# Patient Record
Sex: FEMALE | Race: WHITE | NOT HISPANIC OR LATINO | Employment: FULL TIME | URBAN - METROPOLITAN AREA
[De-identification: names, ages, dates, MRNs, and addresses within clinical notes are randomized per-mention and may not be internally consistent; named-entity substitution may affect disease eponyms.]

---

## 2018-07-26 ENCOUNTER — TELEPHONE (OUTPATIENT)
Dept: CARDIOLOGY | Facility: CLINIC | Age: 53
End: 2018-07-26

## 2018-07-26 ENCOUNTER — HOSPITAL ENCOUNTER (EMERGENCY)
Facility: HOSPITAL | Age: 53
Discharge: HOME | End: 2018-07-26
Attending: EMERGENCY MEDICINE
Payer: COMMERCIAL

## 2018-07-26 VITALS
RESPIRATION RATE: 16 BRPM | HEART RATE: 80 BPM | SYSTOLIC BLOOD PRESSURE: 132 MMHG | WEIGHT: 207.88 LBS | TEMPERATURE: 98 F | BODY MASS INDEX: 34.63 KG/M2 | DIASTOLIC BLOOD PRESSURE: 86 MMHG | HEIGHT: 65 IN | OXYGEN SATURATION: 97 %

## 2018-07-26 DIAGNOSIS — R07.9 CHEST PAIN: Primary | ICD-10-CM

## 2018-07-26 PROBLEM — R07.89 OTHER CHEST PAIN: Status: ACTIVE | Noted: 2018-07-26

## 2018-07-26 LAB
ALBUMIN SERPL BCP-MCNC: 4 G/DL
ALP SERPL-CCNC: 65 U/L
ALT SERPL W/O P-5'-P-CCNC: 15 U/L
ANION GAP SERPL CALC-SCNC: 9 MMOL/L
AST SERPL-CCNC: 18 U/L
BASOPHILS # BLD AUTO: 0.04 K/UL
BASOPHILS NFR BLD: 0.5 %
BILIRUB SERPL-MCNC: 0.7 MG/DL
BUN SERPL-MCNC: 11 MG/DL
CALCIUM SERPL-MCNC: 9.7 MG/DL
CHLORIDE SERPL-SCNC: 103 MMOL/L
CO2 SERPL-SCNC: 27 MMOL/L
CREAT SERPL-MCNC: 0.9 MG/DL
DIFFERENTIAL METHOD: NORMAL
EOSINOPHIL # BLD AUTO: 0.2 K/UL
EOSINOPHIL NFR BLD: 1.7 %
ERYTHROCYTE [DISTWIDTH] IN BLOOD BY AUTOMATED COUNT: 13.1 %
EST. GFR  (AFRICAN AMERICAN): >60 ML/MIN/1.73 M^2
EST. GFR  (NON AFRICAN AMERICAN): >60 ML/MIN/1.73 M^2
GLUCOSE SERPL-MCNC: 97 MG/DL
HCT VFR BLD AUTO: 42.5 %
HGB BLD-MCNC: 14.1 G/DL
IMM GRANULOCYTES # BLD AUTO: 0.02 K/UL
IMM GRANULOCYTES NFR BLD AUTO: 0.2 %
LYMPHOCYTES # BLD AUTO: 2.7 K/UL
LYMPHOCYTES NFR BLD: 30.8 %
MCH RBC QN AUTO: 29.6 PG
MCHC RBC AUTO-ENTMCNC: 33.2 G/DL
MCV RBC AUTO: 89 FL
MONOCYTES # BLD AUTO: 0.5 K/UL
MONOCYTES NFR BLD: 5.8 %
NEUTROPHILS # BLD AUTO: 5.4 K/UL
NEUTROPHILS NFR BLD: 61 %
NRBC BLD-RTO: 0 /100 WBC
PLATELET # BLD AUTO: 266 K/UL
PMV BLD AUTO: 11.1 FL
POTASSIUM SERPL-SCNC: 3.4 MMOL/L
PROT SERPL-MCNC: 7.6 G/DL
RBC # BLD AUTO: 4.76 M/UL
SODIUM SERPL-SCNC: 139 MMOL/L
TROPONIN I SERPL DL<=0.01 NG/ML-MCNC: <0.006 NG/ML
WBC # BLD AUTO: 8.79 K/UL

## 2018-07-26 PROCEDURE — 93010 ELECTROCARDIOGRAM REPORT: CPT | Mod: ,,, | Performed by: INTERNAL MEDICINE

## 2018-07-26 PROCEDURE — 84484 ASSAY OF TROPONIN QUANT: CPT

## 2018-07-26 PROCEDURE — 25000003 PHARM REV CODE 250: Performed by: EMERGENCY MEDICINE

## 2018-07-26 PROCEDURE — 93005 ELECTROCARDIOGRAM TRACING: CPT | Performed by: EMERGENCY MEDICINE

## 2018-07-26 PROCEDURE — 99285 EMERGENCY DEPT VISIT HI MDM: CPT | Mod: ,,, | Performed by: EMERGENCY MEDICINE

## 2018-07-26 PROCEDURE — 99284 EMERGENCY DEPT VISIT MOD MDM: CPT | Mod: 25 | Performed by: EMERGENCY MEDICINE

## 2018-07-26 PROCEDURE — 80053 COMPREHEN METABOLIC PANEL: CPT

## 2018-07-26 PROCEDURE — 85025 COMPLETE CBC W/AUTO DIFF WBC: CPT

## 2018-07-26 PROCEDURE — 99282 EMERGENCY DEPT VISIT SF MDM: CPT | Mod: ,,, | Performed by: INTERNAL MEDICINE

## 2018-07-26 RX ORDER — ASPIRIN 325 MG
325 TABLET ORAL
Status: COMPLETED | OUTPATIENT
Start: 2018-07-26 | End: 2018-07-26

## 2018-07-26 RX ADMIN — ASPIRIN 325 MG ORAL TABLET 325 MG: 325 PILL ORAL at 03:07

## 2018-07-26 NOTE — TELEPHONE ENCOUNTER
"Spoke with the pt at the time of her call - says that her   last week in his sleep after cardiac problems.  Says that last night she had "wrenching pain in her chest - took one SL NTG and pain was relieved. Says that now she is having a heaviness in her chest. Spoke with Dr. Benavidez - says for the pt to go to the ED for evaluation.  The pt verbalized understanding.  "

## 2018-07-26 NOTE — SUBJECTIVE & OBJECTIVE
Past Medical History:   Diagnosis Date    Asthma     Coronary artery disease     Hyperlipidemia     Hypertension        Past Surgical History:   Procedure Laterality Date    TUBAL LIGATION  1993       Review of patient's allergies indicates:  No Known Allergies    No current facility-administered medications on file prior to encounter.      Current Outpatient Prescriptions on File Prior to Encounter   Medication Sig    lisinopril (PRINIVIL,ZESTRIL) 20 MG tablet TK 1 T PO QAM     Family History     Problem Relation (Age of Onset)    Cancer Maternal Grandfather    Diabetes type II Mother    Heart disease Father    Hypertension Mother        Social History Main Topics    Smoking status: Current Every Day Smoker     Types: Cigarettes    Smokeless tobacco: Current User      Comment: 1 pack per week    Alcohol use Yes      Comment: rare, yesterday    Drug use: No    Sexual activity: Yes     Partners: Male     Birth control/ protection: Surgical      Comment:      Review of Systems   Constitution: Negative for chills and fever.   HENT: Negative for hoarse voice and sore throat.    Cardiovascular: Positive for chest pain. Negative for claudication, cyanosis, dyspnea on exertion, irregular heartbeat, leg swelling, near-syncope, orthopnea, palpitations, paroxysmal nocturnal dyspnea and syncope.   Respiratory: Negative for cough, hemoptysis and shortness of breath.    Musculoskeletal: Negative for back pain, joint pain and joint swelling.   Gastrointestinal: Negative for abdominal pain, constipation, diarrhea, hematochezia, melena, nausea and vomiting.   Genitourinary: Negative for dysuria, hematuria and incomplete emptying.   Neurological: Negative for dizziness, headaches and light-headedness.   Psychiatric/Behavioral: Negative for altered mental status, depression and suicidal ideas. The patient is not nervous/anxious.      Objective:     Vital Signs (Most Recent):  Temp: 97.8 °F (36.6 °C) (07/26/18  1254)  Pulse: 80 (07/26/18 1502)  Resp: 16 (07/26/18 1502)  BP: 132/86 (07/26/18 1502)  SpO2: 97 % (07/26/18 1502) Vital Signs (24h Range):  Temp:  [97.8 °F (36.6 °C)] 97.8 °F (36.6 °C)  Pulse:  [80-98] 80  Resp:  [16-18] 16  SpO2:  [96 %-97 %] 97 %  BP: (132-146)/(86-93) 132/86     Weight: 94.3 kg (207 lb 14.3 oz)  Body mass index is 34.6 kg/m².    SpO2: 97 %  O2 Device (Oxygen Therapy): room air    No intake or output data in the 24 hours ending 07/26/18 1559    Lines/Drains/Airways     Peripheral Intravenous Line                 Peripheral IV - Single Lumen 07/26/18 1356 Left Antecubital less than 1 day                Physical Exam   Constitutional: She is oriented to person, place, and time. She appears well-developed and well-nourished. No distress.   HENT:   Head: Normocephalic and atraumatic.   Right Ear: External ear normal.   Left Ear: External ear normal.   Nose: Nose normal.   Mouth/Throat: Oropharynx is clear and moist. No oropharyngeal exudate.   Eyes: Conjunctivae and EOM are normal. Pupils are equal, round, and reactive to light. Right eye exhibits no discharge. Left eye exhibits no discharge. No scleral icterus.   Neck: Normal range of motion. Neck supple. No JVD present. No tracheal deviation present. No thyromegaly present.   Cardiovascular: Normal rate, regular rhythm, normal heart sounds and intact distal pulses.  Exam reveals no gallop and no friction rub.    No murmur heard.  Pulmonary/Chest: Effort normal and breath sounds normal. No stridor. No respiratory distress. She has no wheezes. She has no rales. She exhibits no tenderness.   Abdominal: Soft. Bowel sounds are normal. She exhibits no distension and no mass. There is no tenderness. There is no rebound and no guarding.   Musculoskeletal: Normal range of motion. She exhibits no edema, tenderness or deformity.   Lymphadenopathy:     She has no cervical adenopathy.   Neurological: She is alert and oriented to person, place, and time. No  cranial nerve deficit.   Skin: Skin is warm and dry. No rash noted. She is not diaphoretic. No erythema. No pallor.   Psychiatric: She has a normal mood and affect. Her behavior is normal. Thought content normal.   Nursing note and vitals reviewed.      Significant Labs:   CMP   Recent Labs  Lab 07/26/18  1356      K 3.4*      CO2 27   GLU 97   BUN 11   CREATININE 0.9   CALCIUM 9.7   PROT 7.6   ALBUMIN 4.0   BILITOT 0.7   ALKPHOS 65   AST 18   ALT 15   ANIONGAP 9   ESTGFRAFRICA >60.0   EGFRNONAA >60.0   , CBC   Recent Labs  Lab 07/26/18  1356   WBC 8.79   HGB 14.1   HCT 42.5       and Troponin   Recent Labs  Lab 07/26/18  1356   TROPONINI <0.006       Significant Imaging: EKG: NSR, LBBB

## 2018-07-26 NOTE — ED NOTES
"Patient identifiers verified and correct for Ms Carroll  C/C: freddie rib CP, states she feels "sad" and "numb"   APPEARANCE: awake and alert in NAD.  SKIN: warm, dry and intact. No breakdown or bruising.  MUSCULOSKELETAL: Patient moving all extremities spontaneously, no obvious swelling or deformities noted. Ambulates independently.  RESPIRATORY: Denies shortness of breath.Respirations unlabored.   CARDIAC Positive freddie rib pain, 2+ distal pulses; no peripheral edema, relieved with nitro.   ABDOMEN: S/ND/NT, Denies nausea  : voids spontaneously, denies difficulty  Neurologic: AAO x 4; follows commands equal strength in all extremities; denies numbness/tingling. Denies dizziness Denies weakness    "

## 2018-07-26 NOTE — CONSULTS
[facility]  Cardiology  Consult Note    Patient Name: Ashley Carroll  MRN: [id number]  Admission Date: 7/26/2018  Hospital Length of Stay: 0 days  Code Status: No Order   Attending Provider: Piedad Leong MD   Consulting Provider: Bianca Godinez MD  Primary Care Physician: Kenny Buck MD  Principal Problem:<principal problem not specified>    Patient information was obtained from patient and past medical records.     Inpatient consult to Cardiology  Consult performed by: BIANCA GODINEZ  Consult ordered by: PIEDAD LEONG  Reason for consult: Chest pain        Subjective:     Chief Complaint:  Chest pain     HPI:   53 y.o F with a PMHx of HTN and HLD that presents today with an episode of chest pain which she describes as burning, substernal that occurred yesterday following eating enchiladas at around midnight. She reports the episode lasted 5-10 minutes and she took a NTG which she had an old supply of which helped with her discomfort.  recently passed away on 7/17 and she reports she has been heart broken since that time. She reports having a baseline chest heaviness since that time but her pain yesterday was worse in both intensity and nature. She reports social / intermittent smoking. Denies any further episodes since that time. Currently chest pain free.    She was previously seen by Dr. Benavidez in 2014. Reported [place] and sensation of a punch in the chest when she exerted herself / exercised. Underwent coronary CTA at that time which showed normal coronaries with CAC of 0. Her ECG at that time showed NSR with LBBB.     ECG today: NSR, LBBB, does not meet modified Sgarbossas criteria.     Past Medical History:   Diagnosis Date    Asthma     Coronary artery disease     Hyperlipidemia     Hypertension        Past Surgical History:   Procedure Laterality Date    TUBAL LIGATION  1993       Review of patient's allergies indicates:  No Known Allergies    No current  facility-administered medications on file prior to encounter.      Current Outpatient Prescriptions on File Prior to Encounter   Medication Sig    lisinopril (PRINIVIL,ZESTRIL) 20 MG tablet TK 1 T PO QAM     Family History     Problem Relation (Age of Onset)    Cancer Maternal Grandfather    Diabetes type II Mother    Heart disease Father    Hypertension Mother        Social History Main Topics    Smoking status: Current Every Day Smoker     Types: Cigarettes    Smokeless tobacco: Current User      Comment: 1 pack per week    Alcohol use Yes      Comment: rare, yesterday    Drug use: No    Sexual activity: Yes     Partners: Male     Birth control/ protection: Surgical      Comment:      Review of Systems   Constitution: Negative for chills and fever.   HENT: Negative for hoarse voice and sore throat.    Cardiovascular: Positive for chest pain. Negative for claudication, cyanosis, dyspnea on exertion, irregular heartbeat, leg swelling, near-syncope, orthopnea, palpitations, paroxysmal nocturnal dyspnea and syncope.   Respiratory: Negative for cough, hemoptysis and shortness of breath.    Musculoskeletal: Negative for back pain, joint pain and joint swelling.   Gastrointestinal: Negative for abdominal pain, constipation, diarrhea, hematochezia, melena, nausea and vomiting.   Genitourinary: Negative for dysuria, hematuria and incomplete emptying.   Neurological: Negative for dizziness, headaches and light-headedness.   Psychiatric/Behavioral: Negative for altered mental status, depression and suicidal ideas. The patient is not nervous/anxious.      Objective:     Vital Signs (Most Recent):  Temp: 97.8 °F (36.6 °C) (07/26/18 1254)  Pulse: 80 (07/26/18 1502)  Resp: 16 (07/26/18 1502)  BP: 132/86 (07/26/18 1502)  SpO2: 97 % (07/26/18 1502) Vital Signs (24h Range):  Temp:  [97.8 °F (36.6 °C)] 97.8 °F (36.6 °C)  Pulse:  [80-98] 80  Resp:  [16-18] 16  SpO2:  [96 %-97 %] 97 %  BP: (132-146)/(86-93) 132/86      Weight: 94.3 kg (207 lb 14.3 oz)  Body mass index is 34.6 kg/m².    SpO2: 97 %  O2 Device (Oxygen Therapy): room air    No intake or output data in the 24 hours ending 07/26/18 1559    Lines/Drains/Airways     Peripheral Intravenous Line                 Peripheral IV - Single Lumen 07/26/18 1356 Left Antecubital less than 1 day                Physical Exam   Constitutional: She is oriented to person, place, and time. She appears well-developed and well-nourished. No distress.   HENT:   Head: Normocephalic and atraumatic.   Right Ear: External ear normal.   Left Ear: External ear normal.   Nose: Nose normal.   Mouth/Throat: Oropharynx is clear and moist. No oropharyngeal exudate.   Eyes: Conjunctivae and EOM are normal. Pupils are equal, round, and reactive to light. Right eye exhibits no discharge. Left eye exhibits no discharge. No scleral icterus.   Neck: Normal range of motion. Neck supple. No JVD present. No tracheal deviation present. No thyromegaly present.   Cardiovascular: Normal rate, regular rhythm, normal heart sounds and intact distal pulses.  Exam reveals no gallop and no friction rub.    No murmur heard.  Pulmonary/Chest: Effort normal and breath sounds normal. No stridor. No respiratory distress. She has no wheezes. She has no rales. She exhibits no tenderness.   Abdominal: Soft. Bowel sounds are normal. She exhibits no distension and no mass. There is no tenderness. There is no rebound and no guarding.   Musculoskeletal: Normal range of motion. She exhibits no edema, tenderness or deformity.   Lymphadenopathy:     She has no cervical adenopathy.   Neurological: She is alert and oriented to person, place, and time. No cranial nerve deficit.   Skin: Skin is warm and dry. No rash noted. She is not diaphoretic. No erythema. No pallor.   Psychiatric: She has a normal mood and affect. Her behavior is normal. Thought content normal.   Nursing note and vitals reviewed.      Significant Labs:   CMP    Recent Labs  Lab 07/26/18  1356      K 3.4*      CO2 27   GLU 97   BUN 11   CREATININE 0.9   CALCIUM 9.7   PROT 7.6   ALBUMIN 4.0   BILITOT 0.7   ALKPHOS 65   AST 18   ALT 15   ANIONGAP 9   ESTGFRAFRICA >60.0   EGFRNONAA >60.0   , CBC   Recent Labs  Lab 07/26/18  1356   WBC 8.79   HGB 14.1   HCT 42.5       and Troponin   Recent Labs  Lab 07/26/18  1356   TROPONINI <0.006       Significant Imaging: EKG: NSR, LBBB    Assessment and Plan:     Other chest pain    Chest pain after eating with burning sensation that is subcostal in location that occurred yesterday evening. Lasted 5-10 minutes with improvement with NTG. CAC of 0 on coronary CTA in 2014. ECG without ischemic changes and initial troponin negative.    Plan  - Suggested H2 blocker for GERD / indigestion  - Can follow-up with PCP or cardiologist if any further episodes     Patient seen and discussed with Dr. Benavidez            VTE Risk Mitigation     None          Thank you for your consult. I will sign off. Please contact us if you have any additional questions.    Jerrod Boles MD  Cardiology   [facility]

## 2018-07-26 NOTE — ED NOTES
Pt. Denies questions or concerns regarding discharge instructions or fu. No acute distress noted. Ambulatory to lobby without distress.

## 2018-07-26 NOTE — ED TRIAGE NOTES
"Patient states on July 17th her  passed away, states she has been very "sad" states  last night with Cp like "ripping my heart out" Took nitro last night, states it did help the pain, No pain upon admit to Ed, states it feels like I am "heartbroken" Denies nausea, fever. freddie rib pain. Positive cough. Denies suicidal ideations,.   "

## 2018-07-26 NOTE — ASSESSMENT & PLAN NOTE
Chest pain after eating with burning sensation that is subcostal in location that occurred yesterday evening. Lasted 5-10 minutes with improvement with NTG. CAC of 0 on coronary CTA in 2014. ECG without ischemic changes and initial troponin negative.    Plan  - Suggested H2 blocker for GERD / indigestion  - Can follow-up with PCP or cardiologist if any further episodes     Patient seen and discussed with Dr. Benavidez

## 2018-07-26 NOTE — HPI
53 y.o F with a PMHx of HTN and HLD that presents today with an episode of chest pain which she describes as burning, substernal that occurred yesterday following eating enchiladas at around midnight. She reports the episode lasted 5-10 minutes and she took a NTG which she had an old supply of which helped with her discomfort.  recently passed away on 7/17 and she reports she has been heart broken since that time. She reports having a baseline chest heaviness since that time but her pain yesterday was worse in both intensity and nature. She reports social / intermittent smoking. Denies any further episodes since that time. Currently chest pain free.    She was previously seen by Dr. Benavidez in 2014. Reported [place] and sensation of a punch in the chest when she exerted herself / exercised. Underwent coronary CTA at that time which showed normal coronaries with CAC of 0. Her ECG at that time showed NSR with LBBB.     ECG today: NSR, LBBB, does not meet modified Sgarbossas criteria.

## 2018-07-26 NOTE — ED PROVIDER NOTES
"Encounter Date: 2018    SCRIBE #1 NOTE: I, Nhung Rodriguez, am scribing for, and in the presence of,  Dr. Leong. I have scribed the entire note.       History     Chief Complaint   Patient presents with    Chest Pain     my   , had chest pain last night, crying,had cardiac work up in past,     Time patient was seen by the provider: 1:31 PM      The patient is a 53 y.o. female who presents to the ED with a complaint of CP.  Pt notes hx of cardiac workup before.  Her  recently passed away .  CP started around 10:30 pm last night.  States CP episode lasted about 5-10 min.  Notes that at first the episode made her feel uncomfortable and then became "wrenching."  Reports that pain steadily built.  Denies exacerbation of pain with movement.  Pt took nitro with complete alleviation to CP.  States her CP felt like "overwhelming sadness."  Pt is a current 1 pack per week smoker.  She called her cardiologist, Dr. Benavidez, this morning, who told her to come to the ED.  Pt also notes stress in trying to get custody of her stepson since her  has passed.        The history is provided by the patient and medical records.     Review of patient's allergies indicates:  No Known Allergies  Past Medical History:   Diagnosis Date    Asthma     Coronary artery disease     Hyperlipidemia     Hypertension      Past Surgical History:   Procedure Laterality Date    TUBAL LIGATION       Family History   Problem Relation Age of Onset    Hypertension Mother     Diabetes type II Mother     Heart disease Father     Cancer Maternal Grandfather         Unsure what Cancer    Breast cancer Neg Hx     Colon cancer Neg Hx     Ovarian cancer Neg Hx      Social History   Substance Use Topics    Smoking status: Current Every Day Smoker     Types: Cigarettes    Smokeless tobacco: Current User      Comment: 1 pack per week    Alcohol use Yes      Comment: rare, yesterday     Review of Systems "   Constitutional: Negative for chills and fever.   HENT: Negative for ear pain and nosebleeds.    Eyes: Negative for pain and discharge.   Respiratory: Negative for shortness of breath and wheezing.    Cardiovascular: Positive for chest pain.   Gastrointestinal: Negative for abdominal pain and blood in stool.   Genitourinary: Negative for dysuria and hematuria.   Musculoskeletal: Negative for neck pain and neck stiffness.   Skin: Negative for rash.   Neurological: Negative for speech difficulty and headaches.       Physical Exam     Initial Vitals [07/26/18 1254]   BP Pulse Resp Temp SpO2   (!) 146/93 98 18 97.8 °F (36.6 °C) 96 %      MAP       --         Physical Exam    Nursing note and vitals reviewed.    Appearance: No acute distress.  Skin: No rashes seen.  Good turgor.  No abrasions.  No ecchymoses.  Eyes: No conjunctival injection.  ENT: Oropharynx clear.    Chest: Clear to auscultation bilaterally.  Good air movement.  No wheezes.  No rhonchi.  Cardiovascular: Regular rate and rhythm.  No murmurs. No gallops. No rubs.  Abdomen: Soft.  Not distended.  Nontender.  No guarding.  No rebound.  Musculoskeletal: Good range of motion all joints.  No deformities.  Neck supple.  No meningismus.  Neurologic: Motor intact.  Sensation intact.  Cerebellar intact.  Cranial nerves intact.  Mental Status:  Alert and oriented x 3.  Appropriate, conversant.      ED Course   Procedures  Labs Reviewed   COMPREHENSIVE METABOLIC PANEL - Abnormal; Notable for the following:        Result Value    Potassium 3.4 (*)     All other components within normal limits   CBC W/ AUTO DIFFERENTIAL   TROPONIN I     EKG Readings: (Independently Interpreted)   LBBB       Imaging Results          X-Ray Chest AP Portable (Final result)  Result time 07/26/18 14:39:05    Final result by Ramy Larose MD (07/26/18 14:39:05)                 Impression:      1. No acute cardiopulmonary process.      Electronically signed by: Ramy Larose  MD  Date:    07/26/2018  Time:    14:39             Narrative:    EXAMINATION:  XR CHEST AP PORTABLE    CLINICAL HISTORY:  Chest Pain;    TECHNIQUE:  Single frontal view of the chest was performed.    COMPARISON:  CT 05/07/2014    FINDINGS:  The cardiomediastinal silhouette is prominent, magnified by technique, similar to the previous exam..  There is no pleural effusion.  The trachea is midline.  The lungs are symmetrically expanded bilaterally with mildly coarse interstitial attenuation, likely accentuated by habitus and shallow inspiratory effort..  No large focal consolidation seen.  There is no pneumothorax.  The osseous structures are remarkable for degenerative changes..                                 Medical Decision Making:   History:   Old Medical Records: I decided to obtain old medical records.  Initial Assessment:   Pt with 5-10 min CP last night.  Could be angina, less likely MI.  Also could be entirely due to stress and grief.  Will get cardiac workup in the ED.  EKG shows LBBB, which is old and looks unchanged from 2016. Will have cardiology see the patient given that her cardiologist here sent her to the ED for evaluation.      4:07 PM  Cardiac workup negative.  Cardiology cleared pt for discharge.    Independently Interpreted Test(s):   I have ordered and independently interpreted EKG Reading(s) - see prior notes  Clinical Tests:   Lab Tests: Ordered and Reviewed  Radiological Study: Ordered and Reviewed  Medical Tests: Ordered and Reviewed  Other:   I have discussed this case with another health care provider.            Scribe Attestation:   Scribe #1: I performed the above scribed service and the documentation accurately describes the services I performed. I attest to the accuracy of the note.               Clinical Impression:   The encounter diagnosis was Chest pain.      Disposition:   Disposition: Discharged  Condition: Stable                        Tristen Leong MD  07/29/18 0731

## 2018-07-26 NOTE — TELEPHONE ENCOUNTER
----- Message from Tea Garnica sent at 7/26/2018 11:45 AM CDT -----  Good morning,          Pt called, and believes she may have experienced a heart attack. She states she has taken one nitroglycerin last night and the pain has left. Pt was last seen by Dr. Benavidez on 6/19/2014. Ph for pt is [phone].                                                                                               Thank you

## 2019-09-05 ENCOUNTER — HOSPITAL ENCOUNTER (EMERGENCY)
Facility: HOSPITAL | Age: 54
Discharge: HOME | End: 2019-09-05
Attending: EMERGENCY MEDICINE
Payer: COMMERCIAL

## 2019-09-05 VITALS
RESPIRATION RATE: 18 BRPM | WEIGHT: 200 LBS | DIASTOLIC BLOOD PRESSURE: 78 MMHG | HEART RATE: 69 BPM | SYSTOLIC BLOOD PRESSURE: 129 MMHG | OXYGEN SATURATION: 95 % | HEIGHT: 65 IN | BODY MASS INDEX: 33.32 KG/M2 | TEMPERATURE: 98 F

## 2019-09-05 DIAGNOSIS — D25.9 UTERINE LEIOMYOMA, UNSPECIFIED LOCATION: Primary | ICD-10-CM

## 2019-09-05 DIAGNOSIS — R10.9 ABDOMINAL PAIN, UNSPECIFIED ABDOMINAL LOCATION: ICD-10-CM

## 2019-09-05 LAB
ALBUMIN SERPL BCP-MCNC: 4.2 G/DL (ref 3.5–5.2)
ALP SERPL-CCNC: 62 U/L (ref 55–135)
ALT SERPL W/O P-5'-P-CCNC: 18 U/L (ref 10–44)
ANION GAP SERPL CALC-SCNC: 9 MMOL/L (ref 8–16)
AST SERPL-CCNC: 18 U/L (ref 10–40)
B-HCG UR QL: NEGATIVE
BASOPHILS # BLD AUTO: 0.02 K/UL (ref 0–0.2)
BASOPHILS NFR BLD: 0.3 % (ref 0–1.9)
BILIRUB SERPL-MCNC: 0.6 MG/DL (ref 0.1–1)
BILIRUB UR QL STRIP: NEGATIVE
BUN SERPL-MCNC: 16 MG/DL (ref 6–20)
CALCIUM SERPL-MCNC: 9.3 MG/DL (ref 8.7–10.5)
CHLORIDE SERPL-SCNC: 103 MMOL/L (ref 95–110)
CLARITY UR: CLEAR
CO2 SERPL-SCNC: 28 MMOL/L (ref 23–29)
COLOR UR: YELLOW
CREAT SERPL-MCNC: 0.9 MG/DL (ref 0.5–1.4)
CTP QC/QA: YES
DIFFERENTIAL METHOD: NORMAL
EOSINOPHIL # BLD AUTO: 0.2 K/UL (ref 0–0.5)
EOSINOPHIL NFR BLD: 2.4 % (ref 0–8)
ERYTHROCYTE [DISTWIDTH] IN BLOOD BY AUTOMATED COUNT: 13.3 % (ref 11.5–14.5)
EST. GFR  (AFRICAN AMERICAN): >60 ML/MIN/1.73 M^2
EST. GFR  (NON AFRICAN AMERICAN): >60 ML/MIN/1.73 M^2
GLUCOSE SERPL-MCNC: 102 MG/DL (ref 70–110)
GLUCOSE UR QL STRIP: NEGATIVE
HCT VFR BLD AUTO: 42.9 % (ref 37–48.5)
HGB BLD-MCNC: 14.1 G/DL (ref 12–16)
HGB UR QL STRIP: ABNORMAL
KETONES UR QL STRIP: NEGATIVE
LEUKOCYTE ESTERASE UR QL STRIP: NEGATIVE
LIPASE SERPL-CCNC: 15 U/L (ref 4–60)
LYMPHOCYTES # BLD AUTO: 2.2 K/UL (ref 1–4.8)
LYMPHOCYTES NFR BLD: 32 % (ref 18–48)
MCH RBC QN AUTO: 29.1 PG (ref 27–31)
MCHC RBC AUTO-ENTMCNC: 32.9 G/DL (ref 32–36)
MCV RBC AUTO: 89 FL (ref 82–98)
MICROSCOPIC COMMENT: NORMAL
MONOCYTES # BLD AUTO: 0.4 K/UL (ref 0.3–1)
MONOCYTES NFR BLD: 6.4 % (ref 4–15)
NEUTROPHILS # BLD AUTO: 4 K/UL (ref 1.8–7.7)
NEUTROPHILS NFR BLD: 58.9 % (ref 38–73)
NITRITE UR QL STRIP: NEGATIVE
PH UR STRIP: 5 [PH] (ref 5–8)
PLATELET # BLD AUTO: 275 K/UL (ref 150–350)
PMV BLD AUTO: 10.3 FL (ref 9.2–12.9)
POTASSIUM SERPL-SCNC: 4.1 MMOL/L (ref 3.5–5.1)
PROT SERPL-MCNC: 7.8 G/DL (ref 6–8.4)
PROT UR QL STRIP: NEGATIVE
RBC # BLD AUTO: 4.84 M/UL (ref 4–5.4)
RBC #/AREA URNS HPF: 2 /HPF (ref 0–4)
SODIUM SERPL-SCNC: 140 MMOL/L (ref 136–145)
SP GR UR STRIP: 1.01 (ref 1–1.03)
URN SPEC COLLECT METH UR: ABNORMAL
UROBILINOGEN UR STRIP-ACNC: NEGATIVE EU/DL
WBC # BLD AUTO: 6.76 K/UL (ref 3.9–12.7)

## 2019-09-05 PROCEDURE — 85025 COMPLETE CBC W/AUTO DIFF WBC: CPT

## 2019-09-05 PROCEDURE — 96375 TX/PRO/DX INJ NEW DRUG ADDON: CPT

## 2019-09-05 PROCEDURE — 80053 COMPREHEN METABOLIC PANEL: CPT

## 2019-09-05 PROCEDURE — 83690 ASSAY OF LIPASE: CPT

## 2019-09-05 PROCEDURE — 96361 HYDRATE IV INFUSION ADD-ON: CPT

## 2019-09-05 PROCEDURE — 99285 EMERGENCY DEPT VISIT HI MDM: CPT | Mod: 25

## 2019-09-05 PROCEDURE — 63600175 PHARM REV CODE 636 W HCPCS: Performed by: PHYSICIAN ASSISTANT

## 2019-09-05 PROCEDURE — 25500020 PHARM REV CODE 255: Performed by: PHYSICIAN ASSISTANT

## 2019-09-05 PROCEDURE — 96374 THER/PROPH/DIAG INJ IV PUSH: CPT | Mod: 59

## 2019-09-05 PROCEDURE — 81025 URINE PREGNANCY TEST: CPT | Performed by: NURSE PRACTITIONER

## 2019-09-05 PROCEDURE — 81000 URINALYSIS NONAUTO W/SCOPE: CPT

## 2019-09-05 RX ORDER — ONDANSETRON 2 MG/ML
4 INJECTION INTRAMUSCULAR; INTRAVENOUS
Status: DISCONTINUED | OUTPATIENT
Start: 2019-09-05 | End: 2019-09-05 | Stop reason: HOSPADM

## 2019-09-05 RX ORDER — NAPROXEN 500 MG/1
500 TABLET ORAL 2 TIMES DAILY WITH MEALS
Qty: 20 TABLET | Refills: 0 | OUTPATIENT
Start: 2019-09-05 | End: 2020-06-23

## 2019-09-05 RX ORDER — KETOROLAC TROMETHAMINE 30 MG/ML
10 INJECTION, SOLUTION INTRAMUSCULAR; INTRAVENOUS
Status: COMPLETED | OUTPATIENT
Start: 2019-09-05 | End: 2019-09-05

## 2019-09-05 RX ORDER — ONDANSETRON 2 MG/ML
4 INJECTION INTRAMUSCULAR; INTRAVENOUS
Status: COMPLETED | OUTPATIENT
Start: 2019-09-05 | End: 2019-09-05

## 2019-09-05 RX ORDER — ATORVASTATIN CALCIUM 40 MG/1
40 TABLET, FILM COATED ORAL DAILY
COMMUNITY

## 2019-09-05 RX ORDER — HYDROCHLOROTHIAZIDE 12.5 MG/1
12.5 TABLET ORAL DAILY
COMMUNITY

## 2019-09-05 RX ADMIN — ONDANSETRON HYDROCHLORIDE 4 MG: 2 SOLUTION INTRAMUSCULAR; INTRAVENOUS at 02:09

## 2019-09-05 RX ADMIN — KETOROLAC TROMETHAMINE 10 MG: 30 INJECTION, SOLUTION INTRAMUSCULAR; INTRAVENOUS at 02:09

## 2019-09-05 RX ADMIN — SODIUM CHLORIDE 1000 ML: 0.9 INJECTION, SOLUTION INTRAVENOUS at 03:09

## 2019-09-05 RX ADMIN — IOHEXOL 100 ML: 350 INJECTION, SOLUTION INTRAVENOUS at 02:09

## 2019-09-05 NOTE — ED TRIAGE NOTES
Cramp in left side times 1 week. Denies n/v/d and fever. Tried medication for gas with no relief. Pain comes and goes some are sharp. Best 0 worst 10

## 2019-09-05 NOTE — DISCHARGE INSTRUCTIONS
Please follow-up with gynecologist.  Take medications prescribed.  Return to the emergency room if her symptoms persist or worsen.

## 2019-09-05 NOTE — ED PROVIDER NOTES
Encounter Date: 9/5/2019    SCRIBE #1 NOTE: I, Colleen Weber, am scribing for, and in the presence of,  AILIN Nixon. I have scribed the following portions of the note - Other sections scribed: HPI, ROS, PE.       History     Chief Complaint   Patient presents with    Abdominal Pain     left lower abd pain comes and goes, took OTC meds without relief.     The patient is a 54 year old female with a PMHx of Menorrhagia who presents to the ED for evaluation of intermittent, left-sided abdominal pain that started 1 week ago. The patient reports that the pain has now became constant and adds that she's never experienced these symptoms before. The patient notes an ovarian ablation done a couple years ago due to a heavy menstrual cycle. The patient adds that she no longer has a menstrual cycle, but experiences cramping. The patient notes that she had fibroids, but the ovarian ablation got rid of them. The patient states taking Gas X. The patient denies any allergies to medications. The patient denies a PMHx of renal calculi or diverticulitis. The patient denies any concern for STD. The patient denies vaginal discharge, vaginal bleeding, dysuria, or hematuria. No nausea, vomiting, or diarrhea.    The history is provided by the patient. No  was used.     Review of patient's allergies indicates:  No Known Allergies  Past Medical History:   Diagnosis Date    Asthma     Coronary artery disease     Hyperlipidemia     Hypertension      Past Surgical History:   Procedure Laterality Date    ABLATION-ENDOMETRIAL - NOVASURE N/A 8/24/2016    Performed by [facility] at [facility]    [id number] N/A 8/24/2016    [facility]formed [facility] at [facility] OR    TUBAL LIGATION  1993     Family History   Problem Relation Age of Onset    Hypertension Mother     Diabetes type II Mother     Heart disease Father     Cancer Maternal Grandfather         Unsure what Cancer    Breast  cancer Neg Hx     Colon cancer Neg Hx     Ovarian cancer Neg Hx      Social History     Tobacco Use    Smoking status: Former Smoker     Types: Cigarettes     Last attempt to quit: 2019     Years since quittin.5    Smokeless tobacco: Current User    Tobacco comment: 1 pack per week   Substance Use Topics    Alcohol use: Yes     Comment: rare, yesterday    Drug use: No     Review of Systems   Constitutional: Negative for chills and fever.   HENT: Negative for congestion.    Respiratory: Negative for cough and shortness of breath.    Cardiovascular: Negative for chest pain.   Gastrointestinal: Positive for abdominal pain. Negative for diarrhea, nausea and vomiting.   Genitourinary: Positive for pelvic pain. Negative for dysuria, flank pain, hematuria, vaginal bleeding and vaginal discharge.   Musculoskeletal: Negative for myalgias.   Skin: Negative for rash.   Allergic/Immunologic: Negative for immunocompromised state.   Neurological: Negative for dizziness and weakness.   Hematological: Does not bruise/bleed easily.   Psychiatric/Behavioral: Negative for confusion.       Physical Exam     Initial Vitals [19 1150]   BP Pulse Resp Temp SpO2   (!) 139/90 70 16 97.9 °F (36.6 °C) 98 %      MAP       --         Physical Exam    Vitals reviewed.  Constitutional: She appears well-developed and well-nourished. She is not diaphoretic. No distress.   HENT:   Head: Normocephalic and atraumatic.   Mouth/Throat: Oropharynx is clear and moist.   Eyes: Conjunctivae and EOM are normal.   Neck: Neck supple.   Cardiovascular: Normal rate, regular rhythm, normal heart sounds and intact distal pulses.   Pulmonary/Chest: Breath sounds normal.   Abdominal: Soft. Normal appearance. She exhibits no distension. There is tenderness in the periumbilical area and left lower quadrant. There is no rigidity, no rebound, no guarding and no CVA tenderness.   Neurological: She is alert and oriented to person, place, and time.    Skin: Skin is warm and dry.         ED Course   Procedures  Labs Reviewed   URINALYSIS, REFLEX TO URINE CULTURE - Abnormal; Notable for the following components:       Result Value    Occult Blood UA 2+ (*)     All other components within normal limits    Narrative:     Preferred Collection Type->Urine, Clean Catch   CBC W/ AUTO DIFFERENTIAL    Narrative:     Recoll. [id number] by BEW at 09/05/2019 13:35, reason: Specimen   clotted   COMPREHENSIVE METABOLIC PANEL    Narrative:     Recoll. [id number] by [email or web address] at 09/05/2019 13:58, reason: Specimen   hemolyzed   LIPASE    Narrative:     Recoll. [id number] by [email or web address] at 09/05/2019 13:58, reason: Specimen   hemolyzed   URINALYSIS MICROSCOPIC    Narrative:     Preferred Collection Type->Urine, Clean Catch   POCT URINE PREGNANCY          Imaging Results          US Pelvis Comp with Transvag NON-OB (xpd (Final result)  Result time 09/05/19 17:27:51    Final result by Isabella Calderon MD (09/05/19 17:27:51)                 Impression:      Uterine fibroids, which were not previously measured or detected on the prior exam.      Electronically signed by: Isabella Calderon  Date:    09/05/2019  Time:    17:27             Narrative:    EXAMINATION:  PELVIC ULTRASOUND    CLINICAL HISTORY:  pelvic pain;    TECHNIQUE:  Real-time ultrasound of the pelvis was performed transabdominally and transvaginally.    COMPARISON:  07/27/2016    FINDINGS:  The uterus measures 9.1 x 4.6 x 4.9 cm.  The endometrial stripe measures 2.9 mm.  There are 2 measured uterine fibroids.  One is seen right fundal measuring 2.1 x 1.6 x 1.9 cm.  The other measures 1.4 x 1.2 x 1.2 cm posteriorly..    The right ovary measures 3.2 x 1.8 x 1.6 cm. There is arterial and venous flow detected.    The left ovary measures 3.5 x 2.0 x 1.7 cm. There is arterial and venous flow detected    There is no free fluid in the pelvis.                               CT Abdomen Pelvis With Contrast (Final result)  Result time  09/05/19 15:19:51    Final result by Chencho Singh MD (09/05/19 15:19:51)                 Impression:      1. Lobulated appearance to the fundus of the uterus with findings suspicious for a degenerating fundal uterine fibroid if clinically indicated further evaluation with an ultrasound of the pelvis suggested.  2. Few scattered sigmoid colon diverticula without definite signs for acute diverticulitis.  3. Hiatal hernia as above.  4. Abdominal CT otherwise is unremarkable.      Electronically signed by: Chencho Singh MD  Date:    09/05/2019  Time:    15:19             Narrative:    EXAMINATION:  CT ABDOMEN PELVIS WITH CONTRAST    CLINICAL HISTORY:  LLQ pain, suspect diverticulitis;    TECHNIQUE:  Low dose axial images, sagittal and coronal reformations were obtained from the lung bases to the pubic symphysis following the IV administration of 100 mL of Omnipaque 350 .  No oral contrast was given.    COMPARISON:  None.    FINDINGS:  Lung bases are clear.  There is no pleural effusion.  Heart size is within normal limits.  There is a small sliding hiatal hernia.  No pericardial effusion.    There is normal position and contour of the liver.  No hepatic masses or biliary ductal dilatation.  There are no filling defects in the portal vein or the splenic vein or the SMV.    The spleen, the pancreas, the adrenal glands and the gallbladder are within normal limits.    Symmetric nephrograms bilaterally without hydronephrosis or renal masses.  There is no hydroureter is bilaterally.  The urinary bladder is partially decompressed.  The bladder wall therefore not evaluated well.    There is a anteverted uterus.  In the fundus of the uterus there is suggestion of a uterine fibroid with low density within the uterine fibroid, suspicious for early degenerative changes of the uterine fibroid.  If clinically indicated further evaluation of the uterus with an ultrasound of the pelvis is suggested.  In the adnexa no soft tissue masses  are noted.  No ovarian lesions are seen.  There is no free fluid in the posterior cul-de-sac.    There are few phleboliths in the pelvis.  The visualized rectum, the sigmoid colon demonstrates few small diverticula without definite signs for acute diverticulitis.  There is no significant retained stool within the rest of the colon.  The appendix was not visualized on this study.    No definite signs for small bowel obstruction or perforation.  Within the mesentery no masses or signs for mesenteric adenopathy.  There is no intra-abdominal lymphadenopathy.  Normal tapering of the abdominal aorta without aneurysmal dilatation.    In the visualized lumbar spine and the pelvis there are no osteoblastic or lytic lesions.                                       APC / Resident Notes:   Patient seen in the ER promptly upon arrival.  Pain she is afebrile, no acute distress. Physical exam reveals tenderness on palpation to left lower quadrant of the abdomen.  No CVA tenderness on exam.  Abdomen is otherwise soft, nondistended. IV access was established, labs ordered, fluids given.  Patient was given Toradol for discomfort.    Laboratory studies show normal white count of 6.7.  Hemoglobin stable.  Chemistries unremarkable.  Normal liver and kidney functions.  Urinalysis does show evidence of infection or blood.  CT of the abdomen and pelvis with IV contrast was obtained to rule out evidence of pelvic pathology versus diverticulitis or colitis.  CT reveals lobulated appearance of the fundus of the uterus with findings suspicious for a degenerating fundal uterine fibroid.  No evidence of diverticulitis, diverticulosis noted.    Ultrasound of pelvis reveals to uterine fibroids.  No evidence of ovarian torsions.  No free fluid in the pelvis.    Upon reassessment patient is resting comfortably.  She has had alleviation of her discomfort.  Will prescribed home on naproxen to use as directed.  She was instructed to follow up with her  gyn this week.  Stable for discharge and close follow-up.       Scribe Attestation:   Scribe #1: I performed the above scribed service and the documentation accurately describes the services I performed. I attest to the accuracy of the note.    Attending Attestation:           Physician Attestation for Scribe:  Physician Attestation Statement for Scribe #1: I, AILIN Nixon, reviewed documentation, as scribed by Colleen Weber in my presence, and it is both accurate and complete.         Scribe attestation: I, Kenya Esposito PA-C, personally performed the services described in this documentation. All medical record entries made by the scribe were at my direction and in my presence.  I have reviewed the chart and agree that the record reflects my personal performance and is accurate and complete.             Clinical Impression:       ICD-10-CM ICD-9-CM   1. Uterine leiomyoma, unspecified location D25.9 218.9   2. Abdominal pain, unspecified abdominal location R10.9 789.00         Disposition:   Disposition: Discharged  Condition: Stable                        Kenya Esposito PA-C  09/05/19 1151

## 2020-05-12 ENCOUNTER — HOSPITAL ENCOUNTER (OUTPATIENT)
Dept: RADIOLOGY | Facility: HOSPITAL | Age: 55
Discharge: HOME | End: 2020-05-12
Attending: OBSTETRICS & GYNECOLOGY
Payer: COMMERCIAL

## 2020-05-12 ENCOUNTER — TELEPHONE (OUTPATIENT)
Dept: OBSTETRICS AND GYNECOLOGY | Facility: CLINIC | Age: 55
End: 2020-05-12

## 2020-05-12 DIAGNOSIS — M54.9 BACK PAIN, UNSPECIFIED BACK LOCATION, UNSPECIFIED BACK PAIN LATERALITY, UNSPECIFIED CHRONICITY: ICD-10-CM

## 2020-05-12 DIAGNOSIS — R10.2 PELVIC PAIN IN FEMALE: ICD-10-CM

## 2020-05-12 DIAGNOSIS — R10.2 PELVIC PAIN IN FEMALE: Primary | ICD-10-CM

## 2020-05-12 PROCEDURE — 76830 US PELVIS COMP WITH TRANSVAG NON-OB (XPD): ICD-10-PCS | Mod: 26,,, | Performed by: RADIOLOGY

## 2020-05-12 PROCEDURE — 76856 US EXAM PELVIC COMPLETE: CPT | Mod: 26,,, | Performed by: RADIOLOGY

## 2020-05-12 PROCEDURE — 76856 US PELVIS COMP WITH TRANSVAG NON-OB (XPD): ICD-10-PCS | Mod: 26,,, | Performed by: RADIOLOGY

## 2020-05-12 PROCEDURE — 76830 TRANSVAGINAL US NON-OB: CPT | Mod: 26,,, | Performed by: RADIOLOGY

## 2020-05-12 PROCEDURE — 76830 TRANSVAGINAL US NON-OB: CPT | Mod: TC

## 2020-05-12 NOTE — TELEPHONE ENCOUNTER
"New Pt last seen 9/2016 had an ablation several years ago.  C/o back pain x2 months.  Yesterday pain was severe.  States it feels like a "very bad period".  Took her mother's narcotic in order to move from bathroom to the bed yesterday but since then she has been using heat and taking Aleve.  Requesting an appt this week.  Advised she probably needs an US and one is not available in the office.  Scheduled tomorrow with Dr. Gonzalez.     "

## 2020-05-13 ENCOUNTER — OFFICE VISIT (OUTPATIENT)
Dept: OBSTETRICS AND GYNECOLOGY | Facility: CLINIC | Age: 55
End: 2020-05-13
Payer: COMMERCIAL

## 2020-05-13 VITALS
WEIGHT: 208.75 LBS | BODY MASS INDEX: 34.78 KG/M2 | TEMPERATURE: 99 F | SYSTOLIC BLOOD PRESSURE: 140 MMHG | DIASTOLIC BLOOD PRESSURE: 88 MMHG | HEIGHT: 65 IN

## 2020-05-13 DIAGNOSIS — Z13.820 SCREENING FOR OSTEOPOROSIS: ICD-10-CM

## 2020-05-13 DIAGNOSIS — R10.2 PELVIC PAIN: Primary | ICD-10-CM

## 2020-05-13 DIAGNOSIS — Z11.51 SCREENING FOR HUMAN PAPILLOMAVIRUS: ICD-10-CM

## 2020-05-13 PROCEDURE — 3008F PR BODY MASS INDEX (BMI) DOCUMENTED: ICD-10-PCS | Mod: CPTII,S$GLB,, | Performed by: OBSTETRICS & GYNECOLOGY

## 2020-05-13 PROCEDURE — 3008F BODY MASS INDEX DOCD: CPT | Mod: CPTII,S$GLB,, | Performed by: OBSTETRICS & GYNECOLOGY

## 2020-05-13 PROCEDURE — 3077F PR MOST RECENT SYSTOLIC BLOOD PRESSURE >= 140 MM HG: ICD-10-PCS | Mod: CPTII,S$GLB,, | Performed by: OBSTETRICS & GYNECOLOGY

## 2020-05-13 PROCEDURE — 99999 PR PBB SHADOW E&M-EST. PATIENT-LVL III: CPT | Mod: PBBFAC,,, | Performed by: OBSTETRICS & GYNECOLOGY

## 2020-05-13 PROCEDURE — 99999 PR PBB SHADOW E&M-EST. PATIENT-LVL III: ICD-10-PCS | Mod: PBBFAC,,, | Performed by: OBSTETRICS & GYNECOLOGY

## 2020-05-13 PROCEDURE — 3079F PR MOST RECENT DIASTOLIC BLOOD PRESSURE 80-89 MM HG: ICD-10-PCS | Mod: CPTII,S$GLB,, | Performed by: OBSTETRICS & GYNECOLOGY

## 2020-05-13 PROCEDURE — 3077F SYST BP >= 140 MM HG: CPT | Mod: CPTII,S$GLB,, | Performed by: OBSTETRICS & GYNECOLOGY

## 2020-05-13 PROCEDURE — 99204 PR OFFICE/OUTPT VISIT, NEW, LEVL IV, 45-59 MIN: ICD-10-PCS | Mod: S$GLB,,, | Performed by: OBSTETRICS & GYNECOLOGY

## 2020-05-13 PROCEDURE — 99204 OFFICE O/P NEW MOD 45 MIN: CPT | Mod: S$GLB,,, | Performed by: OBSTETRICS & GYNECOLOGY

## 2020-05-13 PROCEDURE — 3079F DIAST BP 80-89 MM HG: CPT | Mod: CPTII,S$GLB,, | Performed by: OBSTETRICS & GYNECOLOGY

## 2021-04-29 ENCOUNTER — PATIENT MESSAGE (OUTPATIENT)
Dept: RESEARCH | Facility: HOSPITAL | Age: 56
End: 2021-04-29